# Patient Record
Sex: MALE | Race: WHITE
[De-identification: names, ages, dates, MRNs, and addresses within clinical notes are randomized per-mention and may not be internally consistent; named-entity substitution may affect disease eponyms.]

---

## 2017-12-07 NOTE — PCM.HP
H&P History of Present Illness





- General


Date of Service: 12/07/17


Admit Problem/Dx: 


 Admission Diagnosis/Problem





Admission Diagnosis/Problem      Abdominal pain








Source of Information: Patient, Provider





- History of Present Illness


Initial Comments - Free Text/Narative: 





He presented to the ED today with upper abdominal pain. He has been "drinking 

more than I should" lately. He drank six beers yesterday and none today.





 He did not vomit





He was hospitalized several days for pancreatitis several years ago.








  ** Upper Abdomen


Pain Score (Numeric/FACES): 6





- Related Data


Allergies/Adverse Reactions: 


 Allergies











Allergy/AdvReac Type Severity Reaction Status Date / Time


 


No Known Allergies Allergy   Verified 12/07/17 19:22











Home Medications: 


 Home Meds





Fenofibrate Nanocrystallized [Fenofibrate] 145 mg PO DAILY 05/15/15 [History]


Venlafaxine HCl [Venlafaxine ER] 75 tab PO DAILY 05/15/15 [History]


Zolpidem Tartrate 5 mg PO ASDIRECTED PRN 05/15/15 [History]


Insulin Glargine,Hum.Rec.Anlog [Toujeo Solostar] 0 unit SQ DAILY 12/07/17 [

History]


Lisinopril 5 mg PO DAILY 12/07/17 [History]


atorvaSTATin [Lipitor] 40 mg PO BEDTIME 12/07/17 [History]











Past Medical History


Cardiovascular History: Reports: High Cholesterol, Hypertension.  Denies: CAD


Respiratory History: Denies: COPD


Gastrointestinal History: Reports: Pancreatitis.  Denies: Cirrhosis


Genitourinary History: Denies: Chronic Renal Insuffiency


Neurological History: Denies: CVA, MS


Endocrine/Metabolic History: Reports: Diabetes, Type II


Hematologic History: Denies: Anticoagulation Therapy


Oncologic (Cancer) History: Reports: None





- Past Surgical History


Other Surgical History Comment: no prior abdominal surgeries





Social & Family History





- Tobacco Use


Smoking Status *Q: Current Every Day Smoker


Years of Tobacco use: 25


Packs/Tins Daily: 1





- Alcohol Use


Days Per Week of Alcohol Use: 4


Number of Drinks Per Day: 6


Total Drinks Per Week: 24





- Recreational Drug Use


Recreational Drug Use: No


Drug Use in Last 12 Months: No





H&P Review of Systems





- Review of Systems:


Review Of Systems: See Below


General: Denies: Fever, Chills


HEENT: Denies: Ear Pain, Headaches, Sore Throat


Pulmonary: Denies: Shortness of Breath, Wheezing, Cough, Sputum, Hemoptysis


Cardiovascular: Denies: Chest Pain, Edema


Gastrointestinal: Reports: Abdominal Pain.  Denies: Black Stool, Bloody Stool, 

Diarrhea, Hematemesis, Hematochezia, Vomiting


Genitourinary: Denies: Dysuria, Frequency, Hematuria


Neurological: Denies: Confusion





Exam





- Exam


Exam: See Below





- Vital Signs


Vital Signs: 


 Last Vital Signs











Temp  98.0 F   12/07/17 22:12


 


Pulse  88   12/07/17 22:12


 


Resp  18   12/07/17 22:12


 


BP  151/104 H  12/07/17 22:12


 


Pulse Ox  96   12/07/17 22:12











Weight: 95.6 kg





- Exam


General: Alert, Oriented


HEENT: Conjunctiva Clear, EOMI, Mucosa Moist & Pink


Neck: Supple, Trachea Midline


Lungs: Clear to Auscultation, Normal Respiratory Effort


Cardiovascular: Regular Rate, Regular Rhythm


GI/Abdominal Exam: Soft, Tender (mild diffuse upper abdominal tenderness).  No: 

Non-Tender, Distended, Guarding, Rebound


Extremities: No Pedal Edema


Neurological: Cranial Nerves Intact, Normal Speech





- Patient Data


Result Diagrams: 


 12/07/17 19:28





 12/07/17 19:28





*Q Meaningful Use (ADM)





- VTE *Q


VTE Criteria *Q: 








- Stroke *Q


Stroke Criteria *Q: 








- AMI *Q


AMI Criteria *Q: 








- Problem List


(1) Pancreatitis


SNOMED Code(s): 40188955


   ICD Code: K85.90 - ACUTE PANCREATITIS WITHOUT NECROSIS OR INFECTION, UNSP   

Status: Acute   Current Visit: Yes   


Problem List Initiated/Reviewed/Updated: Yes


Orders Last 24hrs: 


 Medication Orders





Sodium Chloride (Saline Flush)  10 ml FLUSH ASDIRECTED PRN


   PRN Reason: Keep Vein Open


Sodium Chloride (Saline Flush)  2.5 ml FLUSH ASDIRECTED PRN


   PRN Reason: Keep Vein Open








Assessment/Plan Comment:: 





bowel rest


close monitoring





Varun Coleman MD

## 2017-12-07 NOTE — EDM.PDOC
ED HPI GENERAL MEDICAL PROBLEM





- General


Chief Complaint: Abdominal Pain


Stated Complaint: PT HAS STOMACH PAINS


Time Seen by Provider: 12/07/17 19:19


Source of Information: Reports: Patient


History Limitations: Reports: No Limitations





- History of Present Illness


INITIAL COMMENTS - FREE TEXT/NARRATIVE: 





HISTORY AND PHYSICAL:


[]52-year-old  male presenting with bilateral side abdominal pain





History of Present Illness:


[Patient is type II diabetic


History of pancreatitis


Pain started today]





Review of Systems:


As per history of present illness and below otherwise all 


systems reviewed and negative.  





Past medical history:


As per history of present illness and as reviewed below


otherwise noncontributory.





Surgical history:


As per history of present illness and as reviewed below


otherwise noncontributory.





Social history:


No reported history of drug or alcohol abuse.





Family history:


As per history of present illness and as reviewed below


otherwise noncontributory.





Physical exam:


Alert and oriented gentleman answering questions appropriately speaking in full 

sentences without any shortness of breath. Skin is warm and dry. Patient is 

rating his pain as 8 out of 10


HEENT: Atraumatic, normocehpalic, pupils reactive, negative for conjunctival 

pallor or scleral icterus, mucous membranes moist, throat clear, neck supple, 

nontender, trachea midline.  


Lungs: Wheezing on auscultation, breath sounds equal bilaterally, chest non 

tender.  


Heart: S1S2, regular, negative for clicks, rubs, or JVD.


Abdomen: Soft, mildly distended, tender to the left epigastric no rebound and 

no guarding.  Negative for masses or hepatossplenmegaly. Negative for 

costovertebral tenderness.


Pelvis: Stable nontender.


Genitourinary: Deferred.


Rectal: Deferred


Extremities: Atraumatic, negative for cords or calf pain.  


Neurovascular unremarkable.


Neuro:  Awake, alert, oriented.  Cranial nerves II through XII


unremarkable.  Cerebellum unremarkable.  Motor and sensory unremarkable 

throughout.  Exam nonfocal.  


Discussed with parents that child does not have appendicitis CT scan shows 

mesenteric adenitis which is generally a viral infection





Diagnostics:


[CBC CMP amylase lipase UA and chest x-ray]





Therapeutics:


[]Normal saline


Morphine





Impression:


[Mesenteric adenitis]





Plan:


[]Discharged home


Tylenol alternated with Motrin as needed for fever every 3 hours


Soft food, chicken soup for the next couple days then may increase as tolerated





Definitive disposition and diagnosis as appropriate pending


reevaluation and review of above.  





Onset: Today, Sudden


Duration: Hour(s):


Location: Reports: Abdomen


Quality: Reports: Ache


Severity: Moderate


Improves with: Reports: None


Worsens with: Reports: None


Associated Symptoms: Reports: Fever/Chills


  ** Upper Abdomen


Pain Score (Numeric/FACES): 8





- Related Data


 Allergies











Allergy/AdvReac Type Severity Reaction Status Date / Time


 


No Known Allergies Allergy   Verified 12/07/17 19:22











Home Meds: 


 Home Meds





Fenofibrate Nanocrystallized [Fenofibrate] 145 mg PO DAILY 05/15/15 [History]


Venlafaxine HCl [Venlafaxine ER] 75 tab PO DAILY 05/15/15 [History]


Zolpidem Tartrate 5 mg PO ASDIRECTED PRN 05/15/15 [History]


Insulin Glargine,Hum.Rec.Anlog [Toujeo Solostar] 0 unit SQ DAILY 12/07/17 [

History]


Lisinopril 5 mg PO DAILY 12/07/17 [History]


atorvaSTATin [Lipitor] 40 mg PO BEDTIME 12/07/17 [History]











Social & Family History





- Tobacco Use


Smoking Status *Q: Current Every Day Smoker


Years of Tobacco use: 25





- Alcohol Use


Days Per Week of Alcohol Use: 4


Number of Drinks Per Day: 6


Total Drinks Per Week: 24





- Recreational Drug Use


Recreational Drug Use: No


Drug Use in Last 12 Months: No





ED ROS GENERAL





- Review of Systems


Review Of Systems: ROS reveals no pertinent complaints other than HPI.





ED EXAM, GI/ABD





- Physical Exam


Exam: See Below (see dictation)





Course





- Vital Signs


Last Recorded V/S: 


 Last Vital Signs











Temp  36.3 C   12/07/17 19:24


 


Pulse  82   12/07/17 19:24


 


Resp  18   12/07/17 19:24


 


BP  154/99 H  12/07/17 19:24


 


Pulse Ox  99   12/07/17 19:24














- Orders/Labs/Meds


Orders: 


 Active Orders 24 hr











 Category Date Time Status


 


 Abdomen Pelvis w Cont [CT] Stat Exams  12/07/17 20:09 Ordered


 


 Sodium Chloride 0.9% [Normal Saline] 1,000 ml Med  12/07/17 19:23 Active





 IV STAT   


 


 Sodium Chloride 0.9% [Normal Saline] 1,000 ml Med  12/07/17 20:13 Active





 IV STAT   


 


 Sodium Chloride 0.9% [Saline Flush] Med  12/07/17 19:21 Active





 10 ml FLUSH ASDIRECTED PRN   


 


 Sodium Chloride 0.9% [Saline Flush] Med  12/07/17 19:21 Active





 2.5 ml FLUSH ASDIRECTED PRN   


 


 Saline Lock Insert [OM.PC] Stat Oth  12/07/17 19:22 Ordered








 Medication Orders





Sodium Chloride (Normal Saline)  1,000 mls @ 999 mls/hr IV STAT ONE


   Stop: 12/07/17 20:23


   Last Admin: 12/07/17 19:54  Dose: 999 mls/hr


Sodium Chloride (Normal Saline)  1,000 mls @ 999 mls/hr IV STAT ONE


   Stop: 12/07/17 21:13


Sodium Chloride (Saline Flush)  10 ml FLUSH ASDIRECTED PRN


   PRN Reason: Keep Vein Open


Sodium Chloride (Saline Flush)  2.5 ml FLUSH ASDIRECTED PRN


   PRN Reason: Keep Vein Open








Labs: 


 Laboratory Tests











  12/07/17 12/07/17 12/07/17 Range/Units





  19:15 19:28 19:28 


 


WBC   11.41 H   (4.0-11.0)  K/uL


 


RBC   4.85   (4.50-5.90)  M/uL


 


Hgb   16.0   (13.0-17.0)  g/dL


 


Hct   46.1   (38.0-50.0)  %


 


MCV   95.1   (80.0-98.0)  fL


 


MCH   33.0 H   (27.0-32.0)  pg


 


MCHC   34.7   (31.0-37.0)  g/dL


 


RDW Std Deviation   46.4   (28.0-62.0)  fl


 


RDW Coeff of Jaden   14   (11.0-15.0)  %


 


Plt Count   190   (150-400)  K/uL


 


MPV   11.30   (7.40-12.00)  fL


 


Neut % (Auto)   64.6   (48.0-80.0)  %


 


Lymph % (Auto)   24.9   (16.0-40.0)  %


 


Mono % (Auto)   7.7   (0.0-15.0)  %


 


Eos % (Auto)   2.3   (0.0-7.0)  %


 


Baso % (Auto)   0.5   (0.0-1.5)  %


 


Neut # (Auto)   7.4 H   (1.4-5.7)  K/uL


 


Lymph # (Auto)   2.8 H   (0.6-2.4)  K/uL


 


Mono # (Auto)   0.9 H   (0.0-0.8)  K/uL


 


Eos # (Auto)   0.3   (0.0-0.7)  K/uL


 


Baso # (Auto)   0.1   (0.0-0.1)  K/uL


 


Nucleated RBC %   0.0   /100WBC


 


Nucleated RBCs #   0   K/uL


 


Lactate     (0.20-2.00)  mmol/L


 


Sodium    141  (136-146)  mmol/L


 


Potassium    4.0  (3.5-5.1)  mmol/L


 


Chloride    103  ()  mmol/L


 


Carbon Dioxide    26  (21-31)  mmol/L


 


BUN    16  (6.0-23.0)  mg/dL


 


Creatinine    1.0  (0.6-1.5)  mg/dL


 


Est Cr Clr Drug Dosing    76.21  mL/min


 


Estimated GFR (MDRD)    > 60.0  ml/min


 


Glucose    128 H  ()  mg/dL


 


Calcium    10.5  (8.8-10.8)  mg/dL


 


Total Bilirubin    0.6  (0.1-1.5)  mg/dL


 


AST    48 H  (5-40)  IU/L


 


ALT    47  (8-54)  IU/L


 


Alkaline Phosphatase    45  ()  


 


Total Protein    8.3 H  (6.0-8.0)  g/dL


 


Albumin    4.8  (3.5-5.0)  g/dL


 


Globulin    3.5  (2.0-3.5)  g/dL


 


Albumin/Globulin Ratio    1.4  (1.3-2.8)  


 


Amylase    132 H  (10-90)  U/L


 


Lipase    248 H  (7-80)  U/L


 


Urine Color  YELLOW    


 


Urine Appearance  CLOUDY    


 


Urine pH  8.0    (5.0-8.0)  


 


Ur Specific Gravity  1.015    (1.001-1.035)  


 


Urine Protein  NEGATIVE    (NEGATIVE)  mg/dL


 


Urine Glucose (UA)  NEGATIVE    (NEGATIVE)  mg/dL


 


Urine Ketones  NEGATIVE    (NEGATIVE)  mg/dL


 


Urine Occult Blood  NEGATIVE    (NEGATIVE)  


 


Urine Nitrite  NEGATIVE    (NEGATIVE)  


 


Urine Bilirubin  NEGATIVE    (NEGATIVE)  


 


Urine Urobilinogen  0.2    (<2.0)  EU/dL


 


Ur Leukocyte Esterase  NEGATIVE    (NEGATIVE)  


 


Urine RBC  0-1    (0-2/HPF)  


 


Urine WBC  0-1    (0-5/HPF)  


 


Ur Epithelial Cells  RARE    (NONE-FEW)  


 


Amorphous Sediment  MODERATE    (NEGATIVE)  


 


Urine Bacteria  FEW    (NEGATIVE)  














  12/07/17 Range/Units





  19:28 


 


WBC   (4.0-11.0)  K/uL


 


RBC   (4.50-5.90)  M/uL


 


Hgb   (13.0-17.0)  g/dL


 


Hct   (38.0-50.0)  %


 


MCV   (80.0-98.0)  fL


 


MCH   (27.0-32.0)  pg


 


MCHC   (31.0-37.0)  g/dL


 


RDW Std Deviation   (28.0-62.0)  fl


 


RDW Coeff of Jaden   (11.0-15.0)  %


 


Plt Count   (150-400)  K/uL


 


MPV   (7.40-12.00)  fL


 


Neut % (Auto)   (48.0-80.0)  %


 


Lymph % (Auto)   (16.0-40.0)  %


 


Mono % (Auto)   (0.0-15.0)  %


 


Eos % (Auto)   (0.0-7.0)  %


 


Baso % (Auto)   (0.0-1.5)  %


 


Neut # (Auto)   (1.4-5.7)  K/uL


 


Lymph # (Auto)   (0.6-2.4)  K/uL


 


Mono # (Auto)   (0.0-0.8)  K/uL


 


Eos # (Auto)   (0.0-0.7)  K/uL


 


Baso # (Auto)   (0.0-0.1)  K/uL


 


Nucleated RBC %   /100WBC


 


Nucleated RBCs #   K/uL


 


Lactate  1.3  (0.20-2.00)  mmol/L


 


Sodium   (136-146)  mmol/L


 


Potassium   (3.5-5.1)  mmol/L


 


Chloride   ()  mmol/L


 


Carbon Dioxide   (21-31)  mmol/L


 


BUN   (6.0-23.0)  mg/dL


 


Creatinine   (0.6-1.5)  mg/dL


 


Est Cr Clr Drug Dosing   mL/min


 


Estimated GFR (MDRD)   ml/min


 


Glucose   ()  mg/dL


 


Calcium   (8.8-10.8)  mg/dL


 


Total Bilirubin   (0.1-1.5)  mg/dL


 


AST   (5-40)  IU/L


 


ALT   (8-54)  IU/L


 


Alkaline Phosphatase   ()  


 


Total Protein   (6.0-8.0)  g/dL


 


Albumin   (3.5-5.0)  g/dL


 


Globulin   (2.0-3.5)  g/dL


 


Albumin/Globulin Ratio   (1.3-2.8)  


 


Amylase   (10-90)  U/L


 


Lipase   (7-80)  U/L


 


Urine Color   


 


Urine Appearance   


 


Urine pH   (5.0-8.0)  


 


Ur Specific Gravity   (1.001-1.035)  


 


Urine Protein   (NEGATIVE)  mg/dL


 


Urine Glucose (UA)   (NEGATIVE)  mg/dL


 


Urine Ketones   (NEGATIVE)  mg/dL


 


Urine Occult Blood   (NEGATIVE)  


 


Urine Nitrite   (NEGATIVE)  


 


Urine Bilirubin   (NEGATIVE)  


 


Urine Urobilinogen   (<2.0)  EU/dL


 


Ur Leukocyte Esterase   (NEGATIVE)  


 


Urine RBC   (0-2/HPF)  


 


Urine WBC   (0-5/HPF)  


 


Ur Epithelial Cells   (NONE-FEW)  


 


Amorphous Sediment   (NEGATIVE)  


 


Urine Bacteria   (NEGATIVE)  











Meds: 


Medications











Generic Name Dose Route Start Last Admin





  Trade Name Freq  PRN Reason Stop Dose Admin


 


Sodium Chloride  1,000 mls @ 999 mls/hr  12/07/17 19:23  12/07/17 19:54





  Normal Saline  IV  12/07/17 20:23  999 mls/hr





  STAT ONE   Administration


 


Sodium Chloride  1,000 mls @ 999 mls/hr  12/07/17 20:13  





  Normal Saline  IV  12/07/17 21:13  





  STAT ONE   


 


Sodium Chloride  10 ml  12/07/17 19:21  





  Saline Flush  FLUSH   





  ASDIRECTED PRN   





  Keep Vein Open   


 


Sodium Chloride  2.5 ml  12/07/17 19:21  





  Saline Flush  FLUSH   





  ASDIRECTED PRN   





  Keep Vein Open   














Discontinued Medications














Generic Name Dose Route Start Last Admin





  Trade Name Freq  PRN Reason Stop Dose Admin


 


Iopamidol  100 ml  12/07/17 20:18  





  Isovue Multipack-370 (76%)  IVPUSH  12/07/17 20:19  





  ONETIME STA   


 


Morphine Sulfate  2 mg  12/07/17 19:23  12/07/17 19:54





  Morphine  IVPUSH  12/07/17 19:24  2 mg





  ONETIME ONE   Administration


 


Morphine Sulfate  2 mg  12/07/17 20:13  





  Morphine  IV  12/07/17 20:14  





  ONETIME ONE   














Departure





- Departure


Time of Disposition: 20:23


Disposition: Home, Self-Care 01


Condition: Good


Clinical Impression: 


 Mesenteric adenitis








- Discharge Information


Instructions:  Abdominal Pain, Adult, Easy-to-Read


Referrals: 


PCP,None [Primary Care Provider] - 


Forms:  ED Department Discharge


Additional Instructions: 


The following information is given to patients seen in the emergency department 

who are being discharged to home. This information is to outline your options 

for follow-up care. We provide all patients seen in our emergency department 

with a follow-up referral.





The need for follow-up, as well as the timing and circumstances, are variable 

depending upon the specifics of your emergency department visit.





If you don't have a primary care physician on staff, we will provide you with a 

referral. We always advise you to contact your personal physician following an 

emergency department visit to inform them of the circumstance of the visit and 

for follow-up with them and/or the need for any referrals to a consulting 

specialist.





The emergency department will also refer you to a specialist when appropriate. 

This referral assures that you have the opportunity for followup care with a 

specialist. All of these measure are taken in an effort to provide you with 

optimal care, which includes your followup.





Under all circumstances we always encourage you to contact your private 

physician who remains a resource for coordinating  your care. When calling for 

followup care, please make the office aware that this follow-up is from your 

recent emergency room visit. If for any reason you are refused follow-up, 

please contact the [facility] emergency department at [phone] 

and asked to speak to the emergency department charge nurse.





Your condition is a viral illness CT scan did not show any signs of appendicitis


Small sips of water or fluids every 20 minutes will keep you hydrated


Recommend that you have soft foods the next couple of days,  chicken soup





- My Orders


Last 24 Hours: 


My Active Orders





12/07/17 19:21


Sodium Chloride 0.9% [Saline Flush]   10 ml FLUSH ASDIRECTED PRN 


Sodium Chloride 0.9% [Saline Flush]   2.5 ml FLUSH ASDIRECTED PRN 





12/07/17 19:22


Saline Lock Insert [OM.PC] Stat 





12/07/17 19:23


Sodium Chloride 0.9% [Normal Saline] 1,000 ml IV STAT 





12/07/17 20:09


Abdomen Pelvis w Cont [CT] Stat 





12/07/17 20:13


Sodium Chloride 0.9% [Normal Saline] 1,000 ml IV STAT 














- Assessment/Plan


Last 24 Hours: 


My Active Orders





12/07/17 19:21


Sodium Chloride 0.9% [Saline Flush]   10 ml FLUSH ASDIRECTED PRN 


Sodium Chloride 0.9% [Saline Flush]   2.5 ml FLUSH ASDIRECTED PRN 





12/07/17 19:22


Saline Lock Insert [OM.PC] Stat 





12/07/17 19:23


Sodium Chloride 0.9% [Normal Saline] 1,000 ml IV STAT 





12/07/17 20:09


Abdomen Pelvis w Cont [CT] Stat 





12/07/17 20:13


Sodium Chloride 0.9% [Normal Saline] 1,000 ml IV STAT

## 2017-12-08 NOTE — CT
EXAM DATE: 17



PATIENT'S AGE: 52



Patient: KATELYNN SANTOS



Facility: [place], [place]

Patient ID: [id number]

Site Patient ID: [id number].

Site Accession #: [id number].

: 1965

Study: CT Abdomen/Pelvis [id number]/[id number] 9:17:41 PM

Ordering Physician: Doctor Temp



Final Report: 

INDICATION: 

UPPER ABD PAIN





TECHNIQUE:

CT abdomen and pelvis acquired with IV contrast.



COMPARISON:

2012 



FINDINGS:

Lower chest: Unremarkable. 

Liver: Diffuse fatty infiltration of the liver 

Spleen: Unremarkable. 

Pancreas: Mild peripancreatic stranding along the uncinate process and head of 
the pancreas. 

Gallbladder and bile ducts: Unremarkable. 

Kidneys: Stable left renal cyst. . 

Adrenal glands: Unremarkable. 

GI tract: Liquid stool throughout the colon. Fluid-filled loops of small bowel. 
Scattered colonic diverticulosis with no evidence of acute diverticulitis. 
Appendix is normal. 

Vascular structures: Atherosclerotic disease. No sign of aneurysm. 

Lymph nodes: Unremarkable. 

Miscellaneous: Unremarkable. No free air or significant free fluid. 

Pelvic Organs: The prostate is prominent in size. 

Bones: Multilevel degenerative changes. 



IMPRESSION:

1. Liquid stool throughout the colon. Fluid-filled loops of small bowel. 
Findings are nonspecific, and can be seen with gastroenteritis/enterocolitis.

2. Mild acute pancreatitis along the uncinate process and head of the pancreas



Dictated by Alan Ann MD @ 2017 9:33:31 PM



Dictated by: Alan Ann MD @ 2017 21:33:42

(Electronic Signature)



Report Signed by Proxy.
[facility]

## 2017-12-08 NOTE — PCM.PN
- General Info


Date of Service: 12/08/17


Admission Dx/Problem (Free Text): 


 Admission Diagnosis/Problem





Admission Diagnosis/Problem      Abdominal pain, pancreatitis








Subjective Update: 





Feeling better than when he arrived to ED last night. No chest pain or SOB. 

Pain 3/10 currently, to epigastric and wraps to LUQ. Having diarrhea as well. 

Took some Milk of Mag yesterday because of cramping, he thought he might be 

constipated. 


Functional Status: Reports: Pain Controlled, Tolerating Diet, Ambulating, 

Urinating





- Review of Systems


HEENT: Reports: No Symptoms.  Denies: Sore Throat


Pulmonary: Reports: No Symptoms.  Denies: Shortness of Breath


Cardiovascular: Reports: No Symptoms.  Denies: Chest Pain


Gastrointestinal: Reports: Abdominal Pain (epigastric wrapping around LUQ), 

Diarrhea, Flatus.  Denies: Nausea, Vomiting


Genitourinary: Reports: No Symptoms.  Denies: Dysuria, Frequency, Burning


Musculoskeletal: Reports: No Symptoms


Neurological: Reports: No Symptoms


Psychiatric: Reports: No Symptoms





- Patient Data


Vitals - Most Recent: 


 Last Vital Signs











Temp  98.6 F   12/08/17 04:00


 


Pulse  75   12/08/17 04:00


 


Resp  20   12/08/17 04:00


 


BP  162/102 H  12/08/17 04:00


 


Pulse Ox  95   12/08/17 04:00











Weight - Most Recent: 94.7 kg


I&O - Last 24 Hours: 


 Intake & Output











 12/07/17 12/08/17 12/08/17





 22:59 06:59 14:59


 


Intake Total  1575 


 


Output Total  400 


 


Balance  1175 











Lab Results Last 24 Hours: 


 Laboratory Results - last 24 hr











  12/08/17 12/08/17 Range/Units





  05:24 05:24 


 


WBC  8.20   (4.0-11.0)  K/uL


 


RBC  4.47 L   (4.50-5.90)  M/uL


 


Hgb  14.5   (13.0-17.0)  g/dL


 


Hct  43.2   (38.0-50.0)  %


 


MCV  96.6   (80.0-98.0)  fL


 


MCH  32.4 H   (27.0-32.0)  pg


 


MCHC  33.6   (31.0-37.0)  g/dL


 


RDW Std Deviation  47.3   (28.0-62.0)  fl


 


RDW Coeff of Jaden  14   (11.0-15.0)  %


 


Plt Count  155   (150-400)  K/uL


 


MPV  11.50   (7.40-12.00)  fL


 


Neut % (Auto)  58.4   (48.0-80.0)  %


 


Lymph % (Auto)  29.8   (16.0-40.0)  %


 


Mono % (Auto)  8.9   (0.0-15.0)  %


 


Eos % (Auto)  2.4   (0.0-7.0)  %


 


Baso % (Auto)  0.5   (0.0-1.5)  %


 


Neut # (Auto)  4.8   (1.4-5.7)  K/uL


 


Lymph # (Auto)  2.4   (0.6-2.4)  K/uL


 


Mono # (Auto)  0.7   (0.0-0.8)  K/uL


 


Eos # (Auto)  0.2   (0.0-0.7)  K/uL


 


Baso # (Auto)  0.0   (0.0-0.1)  K/uL


 


Nucleated RBC %  0.0   /100WBC


 


Nucleated RBCs #  0   K/uL


 


Sodium   142  (136-146)  mmol/L


 


Potassium   4.4  (3.5-5.1)  mmol/L


 


Chloride   111 H  ()  mmol/L


 


Carbon Dioxide   23  (21-31)  mmol/L


 


BUN   13  (6.0-23.0)  mg/dL


 


Creatinine   0.9  (0.6-1.5)  mg/dL


 


Est Cr Clr Drug Dosing   84.67  mL/min


 


Estimated GFR (MDRD)   > 60.0  ml/min


 


Glucose   94  ()  mg/dL


 


Calcium   8.5 L  (8.8-10.8)  mg/dL


 


Magnesium   2.3  (1.5-2.3)  mEq/L


 


Triglycerides   217 H  ()  mg/dL


 


Cholesterol   131  (131-240)  mg/dL


 


LDL Cholesterol, Calc   56 L  ()  mg/dL


 


VLDL Cholesterol   43  (5-55)  mg/dL


 


HDL Cholesterol   32 L  (40-80)  mg/dL


 


Cholesterol/HDL Ratio   4.1  (3.3-6.0)  


 


Lipase   72  (7-80)  U/L











Med Orders - Current: 


 Current Medications





Atorvastatin Calcium (Lipitor)  40 mg PO BEDTIME [facility]


Folic Acid (Folic Acid)  1 mg PO DAILY [facility]


Sodium Chloride (Normal Saline)  1,000 mls @ 150 mls/hr IV ASDIRECTED [facility]


   Last Admin: 12/08/17 06:43 Dose:  150 mls/hr


Insulin Aspart (Novolog)  0 unit SUBCUT ACBED [facility]


   PRN Reason: Protocol


   Last Admin: 12/08/17 06:49 Dose:  Not Given


Insulin Glargine (Lantus Solostar)  10 units SUBCUT BIDAC [facility]


   Last Admin: 12/08/17 06:58 Dose:  Not Given


Lisinopril (Prinivil)  5 mg PO DAILY [facility]


Lorazepam (Ativan)  1 mg IVPUSH Q4H PRN


   PRN Reason: Anxiety


Morphine Sulfate (Morphine)  2 mg IVPUSH Q2H PRN


   PRN Reason: Pain (severe 7-10)


   Stop: 12/08/17 22:40


   Last Admin: 12/08/17 06:56 Dose:  2 mg


Ondansetron HCl (Zofran)  4 mg IVPUSH Q4H PRN


   PRN Reason: Nausea


Fenofibrate 145 Mg  1 each PO DAILY [facility]


Sodium Chloride (Saline Flush)  10 ml FLUSH ASDIRECTED PRN


   PRN Reason: Keep Vein Open


Sodium Chloride (Saline Flush)  2.5 ml FLUSH ASDIRECTED PRN


   PRN Reason: Keep Vein Open


Temazepam (Restoril)  15 mg PO BEDTIME PRN


   PRN Reason: Insomnia


   Last Admin: 12/07/17 23:49 Dose:  15 mg


Thiamine HCl (Vitamin B-1)  100 mg IV DAILY [facility]


Venlafaxine HCl (Effexor Xr)  75 mg PO DAILY [facility]





Discontinued Medications





Sodium Chloride (Normal Saline)  1,000 mls @ 999 mls/hr IV STAT ONE


   Stop: 12/07/17 20:23


   Last Admin: 12/07/17 19:54 Dose:  999 mls/hr


Sodium Chloride (Normal Saline)  1,000 mls @ 999 mls/hr IV STAT ONE


   Stop: 12/07/17 21:13


   Last Admin: 12/07/17 20:34 Dose:  999 mls/hr


Influenza Virus Vaccine (Fluarix Quad 5501-7711)  60 mcg IM .ONCE ONE


   Stop: 12/07/17 23:46


Iopamidol (Isovue Multipack-370 (76%))  100 ml IVPUSH ONETIME STA


   Stop: 12/07/17 20:19


   Last Admin: 12/07/17 20:23 Dose:  100 ml


Morphine Sulfate (Morphine)  2 mg IVPUSH ONETIME ONE


   Stop: 12/07/17 19:24


   Last Admin: 12/07/17 19:54 Dose:  2 mg


Morphine Sulfate (Morphine)  2 mg IV ONETIME ONE


   Stop: 12/07/17 20:14


   Last Admin: 12/07/17 20:36 Dose:  2 mg


Pantoprazole Sodium (Protonix Iv***)  80 mg IVPUSH .BOLUS ONE


   Stop: 12/07/17 21:01


   Last Admin: 12/07/17 21:20 Dose:  80 mg











- Exam


Quality Assessment: DVT Prophylaxis


General: Alert, Oriented, Cooperative, No Acute Distress


Neck: Supple


Cardiovascular: Regular Rate, Regular Rhythm


GI/Abdominal Exam: Normal Bowel Sounds, Soft, No Organomegaly, No Distention, 

No Abnormal Bruit, No Mass, Pelvis Stable, Tender (Epigastric region)


Extremities: Normal Inspection, Normal Range of Motion, Non-Tender, No Pedal 

Edema, Normal Capillary Refill


Neurological: Other (scant tremor noted in hands. )


Psy/Mental Status: Alert, Normal Affect, Normal Mood





- Problem List & Annotations


(1) Pancreatitis


SNOMED Code(s): 76513120


   Code(s): K85.90 - ACUTE PANCREATITIS WITHOUT NECROSIS OR INFECTION, UNSP   

Status: Acute   Current Visit: Yes   


Qualifiers: 


   Chronicity: acute   Pancreatitis type: alcohol induced   Acute pancreatitis 

complication: no infection or necrosis   Qualified Code(s): K85.20 - Alcohol 

induced acute pancreatitis without necrosis or infection   





(2) Alcohol abuse


SNOMED Code(s): 52914557


   Code(s): F10.10 - ALCOHOL ABUSE, UNCOMPLICATED   Status: Chronic   Current 

Visit: Yes   





(3) DM type 2 (diabetes mellitus, type 2)


SNOMED Code(s): 64534660


   Code(s): E11.9 - TYPE 2 DIABETES MELLITUS WITHOUT COMPLICATIONS   Status: 

Chronic   Current Visit: Yes   


Qualifiers: 


   Diabetes mellitus complication status: without complication   Diabetes 

mellitus long term insulin use: with long term use   Qualified Code(s): E11.9 - 

Type 2 diabetes mellitus without complications; Z79.4 - Long term (current) use 

of insulin; Z79.4 - Long term (current) use of insulin; Z79.4 - Long term (

current) use of insulin; Z79.4 - Long term (current) use of insulin   





(4) HTN (hypertension)


SNOMED Code(s): 94103251


   Code(s): I10 - ESSENTIAL (PRIMARY) HYPERTENSION   Status: Chronic   Current 

Visit: Yes   


Qualifiers: 


   Hypertension type: essential hypertension   Qualified Code(s): I10 - 

Essential (primary) hypertension   





(5) Hyperlipidemia


SNOMED Code(s): 21053143


   Code(s): E78.5 - HYPERLIPIDEMIA, UNSPECIFIED   Status: Chronic   Current 

Visit: Yes   





- Problem List Review


Problem List Initiated/Reviewed/Updated: Yes





- My Orders


Last 24 Hours: 


My Active Orders





12/08/17 07:57


[facility] Assessment [RC] Q4H 





12/08/17 09:00


Folic Acid   1 mg PO DAILY 


Thiamine [Vitamin B-1]   100 mg IV DAILY 














- Plan


Plan:: 





This 52 year old male admitted with abdominal pain, found to have acute alcohol 

induced pancreatitis





1. Acute pancreatitis: Improving, Lipase 72 today. No nausea, CL causing some 

increased pain. Continue IVF,  for now. Analgesia PRN pain. Having some 

diarrhea, will check stool studies as well, due to possible gastroenteritis 

notes on CT.





2. Alcohol abuse: scant tremore noted. [facility] protocol ordered. Ativan PRN. Will 

supplement with Folic acid and thiamine. 





3. DM type 2. Monitor BSStephanie held this am due to BS of 90. Novolog SSI 

ordered TIDAC. Triglycerides 217 today. 





4.  HTN: Slightly elevated, pain related? Continue Lisinopril today and monitor.





5. Hyperlipidemia: Triglycerides 217. Continue Atorvastatin and Fenofibrate.





VTE prophylaxis:   SCDs due to daily alcohol use.





Dispo: 1-2 days pending improvement.

## 2017-12-09 NOTE — [id number]
**Discharge Summary





- Hospital Course


Free Text/Narrative:: 





53 yo male with history of alchol abuse admitted for pancreatitis. His wbc wnl, 

amylase 132, lipase 72. abdominal CT showed mild acute pancreatitis. He was 

givine IVF kept NPO. IV mophine for pain control. He clinically improved. His 

diet was advanced. He tolerated it well and his pain has resolved. He is 

discharged in stable condition and to resume home medications. He was advised 

to refrain from alcohol use. He had agreed. He is to follow up with PCP. 





- Discharge Data


Discharge Date: 12/09/17


Discharge Disposition: Home, Self-Care 01


Condition: Good





- Discharge Plan


Home Medications: 


 Home Meds





Fenofibrate Nanocrystallized [Fenofibrate] 145 mg PO DAILY 05/15/15 [History]


Venlafaxine HCl [Venlafaxine ER] 75 tab PO DAILY 05/15/15 [History]


Zolpidem Tartrate 5 mg PO BEDTIME 05/15/15 [History]


Insulin Glargine,Hum.Rec.Anlog [Toujeo Solostar] 17 unit SQ DAILY 12/07/17 [

History]


Lisinopril 5 mg PO DAILY 12/07/17 [History]


atorvaSTATin [Lipitor] 40 mg PO BEDTIME 12/07/17 [History]








Patient Handouts:  Acute Pancreatitis, Easy-to-Read, Abdominal Pain, Adult, Easy

-to-Read


Referrals: 


PCP,None [Primary Care Provider] -  (Please follow-up with your Primary Care 

Provider within 1 week. )





- General Info


Date of Service: 12/09/17


Functional Status: Reports: Pain Controlled, Tolerating Diet, Ambulating, 

Urinating





- Review of Systems


General: Reports: No Symptoms


HEENT: Reports: No Symptoms


Pulmonary: Reports: No Symptoms


Cardiovascular: Reports: No Symptoms


Gastrointestinal: Reports: No Symptoms


Genitourinary: Reports: No Symptoms


Musculoskeletal: Reports: No Symptoms


Skin: Reports: No Symptoms


Neurological: Reports: No Symptoms


Psychiatric: Reports: No Symptoms





- Patient Data


Vitals - Most Recent: 


 Last Vital Signs











Temp  97.1 F   12/09/17 08:00


 


Pulse  75   12/09/17 08:00


 


Resp  18   12/09/17 08:00


 


BP  143/102 H  12/09/17 09:06


 


Pulse Ox  97   12/09/17 08:00











Weight - Most Recent: 94.7 kg


I&O - Last 24 hours: 


 Intake & Output











 12/08/17 12/09/17 12/09/17





 22:59 06:59 14:59


 


Intake Total 2883 1600 866


 


Output Total 2250 2800 


 


Balance 633 -1200 866











Lab Results - Last 24 hrs: 


 Laboratory Results - last 24 hr











  12/08/17 12/08/17 12/08/17 Range/Units





  11:28 17:08 20:21 


 


WBC     (4.0-11.0)  K/uL


 


RBC     (4.50-5.90)  M/uL


 


Hgb     (13.0-17.0)  g/dL


 


Hct     (38.0-50.0)  %


 


MCV     (80.0-98.0)  fL


 


MCH     (27.0-32.0)  pg


 


MCHC     (31.0-37.0)  g/dL


 


RDW Std Deviation     (28.0-62.0)  fl


 


RDW Coeff of Jaden     (11.0-15.0)  %


 


Plt Count     (150-400)  K/uL


 


MPV     (7.40-12.00)  fL


 


Neut % (Auto)     (48.0-80.0)  %


 


Lymph % (Auto)     (16.0-40.0)  %


 


Mono % (Auto)     (0.0-15.0)  %


 


Eos % (Auto)     (0.0-7.0)  %


 


Baso % (Auto)     (0.0-1.5)  %


 


Neut # (Auto)     (1.4-5.7)  K/uL


 


Lymph # (Auto)     (0.6-2.4)  K/uL


 


Mono # (Auto)     (0.0-0.8)  K/uL


 


Eos # (Auto)     (0.0-0.7)  K/uL


 


Baso # (Auto)     (0.0-0.1)  K/uL


 


Nucleated RBC %     /100WBC


 


Nucleated RBCs #     K/uL


 


Sodium     (136-146)  mmol/L


 


Potassium     (3.5-5.1)  mmol/L


 


Chloride     ()  mmol/L


 


Carbon Dioxide     (21-31)  mmol/L


 


BUN     (6.0-23.0)  mg/dL


 


Creatinine     (0.6-1.5)  mg/dL


 


Est Cr Clr Drug Dosing     mL/min


 


Estimated GFR (MDRD)     ml/min


 


Glucose     ()  mg/dL


 


POC Glucose  130 H  95  131 H  ()  mg/dL


 


Calcium     (8.8-10.8)  mg/dL














  12/09/17 12/09/17 12/09/17 Range/Units





  06:03 06:03 06:30 


 


WBC  5.78    (4.0-11.0)  K/uL


 


RBC  4.05 L    (4.50-5.90)  M/uL


 


Hgb  13.1    (13.0-17.0)  g/dL


 


Hct  39.4    (38.0-50.0)  %


 


MCV  97.3    (80.0-98.0)  fL


 


MCH  32.3 H    (27.0-32.0)  pg


 


MCHC  33.2    (31.0-37.0)  g/dL


 


RDW Std Deviation  47.8    (28.0-62.0)  fl


 


RDW Coeff of Jaden  13    (11.0-15.0)  %


 


Plt Count  130 L    (150-400)  K/uL


 


MPV  11.40    (7.40-12.00)  fL


 


Neut % (Auto)  64.2    (48.0-80.0)  %


 


Lymph % (Auto)  25.1    (16.0-40.0)  %


 


Mono % (Auto)  7.3    (0.0-15.0)  %


 


Eos % (Auto)  2.9    (0.0-7.0)  %


 


Baso % (Auto)  0.5    (0.0-1.5)  %


 


Neut # (Auto)  3.7    (1.4-5.7)  K/uL


 


Lymph # (Auto)  1.5    (0.6-2.4)  K/uL


 


Mono # (Auto)  0.4    (0.0-0.8)  K/uL


 


Eos # (Auto)  0.2    (0.0-0.7)  K/uL


 


Baso # (Auto)  0.0    (0.0-0.1)  K/uL


 


Nucleated RBC %  0.0    /100WBC


 


Nucleated RBCs #  0    K/uL


 


Sodium   142   (136-146)  mmol/L


 


Potassium   4.1   (3.5-5.1)  mmol/L


 


Chloride   113 H   ()  mmol/L


 


Carbon Dioxide   22   (21-31)  mmol/L


 


BUN   6   (6.0-23.0)  mg/dL


 


Creatinine   0.8   (0.6-1.5)  mg/dL


 


Est Cr Clr Drug Dosing   95.26   mL/min


 


Estimated GFR (MDRD)   > 60.0   ml/min


 


Glucose   116 H   ()  mg/dL


 


POC Glucose    97  ()  mg/dL


 


Calcium   8.4 L   (8.8-10.8)  mg/dL











Med Orders - Current: 


 Current Medications





Atorvastatin Calcium (Lipitor)  40 mg PO BEDTIME [facility]


   Last Admin: 12/08/17 20:22 Dose:  40 mg


Calcium Carbonate/Glycine (Tums)  1,000 mg PO Q2HR PRN


   PRN Reason: Indigestion


   Last Admin: 12/08/17 23:19 Dose:  1,000 mg


Folic Acid (Folic Acid)  1 mg PO DAILY [facility]


   Last Admin: 12/09/17 09:06 Dose:  1 mg


Thiamine HCl 100 mg/ Sodium (Chloride)  51 mls @ 204 mls/hr IV DAILY [facility]


   Last Admin: 12/09/17 09:07 Dose:  204 mls/hr


Insulin Aspart (Novolog)  0 unit SUBCUT ACBED [facility]


   PRN Reason: Protocol


   Last Admin: 12/09/17 06:44 Dose:  Not Given


Lisinopril (Prinivil)  5 mg PO DAILY [facility]


   Last Admin: 12/09/17 09:06 Dose:  5 mg


Lorazepam (Ativan)  1 mg IVPUSH Q4H PRN


   PRN Reason: Anxiety


Morphine Sulfate (Morphine)  2 mg IVPUSH Q2H PRN


   PRN Reason: Pain (severe 7-10)


   Last Admin: 12/08/17 20:27 Dose:  2 mg


Ondansetron HCl (Zofran)  4 mg IVPUSH Q4H PRN


   PRN Reason: Nausea


Fenofibrate 145 Mg  1 each PO DAILY [facility]


   Last Admin: 12/09/17 09:20 Dose:  Not Given


Sodium Chloride (Saline Flush)  10 ml FLUSH ASDIRECTED PRN


   PRN Reason: Keep Vein Open


Sodium Chloride (Saline Flush)  2.5 ml FLUSH ASDIRECTED PRN


   PRN Reason: Keep Vein Open


Temazepam (Restoril)  15 mg PO BEDTIME PRN


   PRN Reason: Insomnia


   Last Admin: 12/08/17 23:20 Dose:  15 mg


Venlafaxine HCl (Effexor Xr)  75 mg PO DAILY [facility]


   Last Admin: 12/09/17 09:07 Dose:  75 mg





Discontinued Medications





Acetaminophen (Tylenol)  650 mg PO NOW ONE


   Stop: 12/09/17 08:32


   Last Admin: 12/09/17 09:06 Dose:  650 mg


Sodium Chloride (Normal Saline)  1,000 mls @ 999 mls/hr IV STAT ONE


   Stop: 12/07/17 20:23


   Last Admin: 12/07/17 19:54 Dose:  999 mls/hr


Sodium Chloride (Normal Saline)  1,000 mls @ 999 mls/hr IV STAT ONE


   Stop: 12/07/17 21:13


   Last Admin: 12/07/17 20:34 Dose:  999 mls/hr


Sodium Chloride (Normal Saline)  1,000 mls @ 150 mls/hr IV ASDIRECTED [facility]


   Last Admin: 12/09/17 03:44 Dose:  150 mls/hr


Influenza Virus Vaccine (Fluarix Quad 8411-0815)  60 mcg IM .ONCE ONE


   Stop: 12/07/17 23:46


   Last Admin: 12/08/17 15:03 Dose:  60 mcg


Insulin Glargine (Lantus Solostar)  10 units SUBCUT BIDAC [facility]


   Last Admin: 12/08/17 06:58 Dose:  Not Given


Iopamidol (Isovue Multipack-370 (76%))  100 ml IVPUSH ONETIME STA


   Stop: 12/07/17 20:19


   Last Admin: 12/07/17 20:23 Dose:  100 ml


Morphine Sulfate (Morphine)  2 mg IVPUSH ONETIME ONE


   Stop: 12/07/17 19:24


   Last Admin: 12/07/17 19:54 Dose:  2 mg


Morphine Sulfate (Morphine)  2 mg IV ONETIME ONE


   Stop: 12/07/17 20:14


   Last Admin: 12/07/17 20:36 Dose:  2 mg


Morphine Sulfate (Morphine)  2 mg IVPUSH Q2H PRN


   PRN Reason: Pain (severe 7-10)


   Stop: 12/08/17 22:40


   Last Admin: 12/08/17 06:56 Dose:  2 mg


Pantoprazole Sodium (Protonix Iv***)  80 mg IVPUSH .BOLUS ONE


   Stop: 12/07/17 21:01


   Last Admin: 12/07/17 21:20 Dose:  80 mg











- Exam


General: Reports: Alert, Oriented


HEENT: Reports: Pupils Equal, EOMI


Lungs: Reports: Clear to Auscultation, Normal Respiratory Effort


Cardiovascular: Reports: Regular Rate, Regular Rhythm


GI/Abdominal Exam: Normal Bowel Sounds, Soft, Other (pain improved)


Back Exam: Reports: Normal Inspection


Skin: Reports: Warm, Dry, Intact


Neurological: Reports: No New Focal Deficit


Psy/Mental Status: Reports: Alert, Normal Affect, Normal Mood





*Q Meaningful Use (DIS)





- VTE *Q


VTE Criteria *Q: 








- Stroke *Q


Stroke Criteria *Q: 








- AMI *Q


AMI Criteria *Q:

## 2019-03-30 ENCOUNTER — HOSPITAL ENCOUNTER (EMERGENCY)
Dept: HOSPITAL 56 - MW.ED | Age: 54
Discharge: HOME | End: 2019-03-30
Payer: COMMERCIAL

## 2019-03-30 VITALS — DIASTOLIC BLOOD PRESSURE: 96 MMHG | SYSTOLIC BLOOD PRESSURE: 126 MMHG

## 2019-03-30 DIAGNOSIS — I10: ICD-10-CM

## 2019-03-30 DIAGNOSIS — F32.9: ICD-10-CM

## 2019-03-30 DIAGNOSIS — E11.9: ICD-10-CM

## 2019-03-30 DIAGNOSIS — Z23: ICD-10-CM

## 2019-03-30 DIAGNOSIS — Z79.899: ICD-10-CM

## 2019-03-30 DIAGNOSIS — F41.9: ICD-10-CM

## 2019-03-30 DIAGNOSIS — B96.89: ICD-10-CM

## 2019-03-30 DIAGNOSIS — L08.9: Primary | ICD-10-CM

## 2019-03-30 DIAGNOSIS — E78.00: ICD-10-CM

## 2019-03-30 DIAGNOSIS — F17.210: ICD-10-CM

## 2019-03-30 NOTE — EDM.PDOC
ED HPI GENERAL MEDICAL PROBLEM





- General


Chief Complaint: Lower Extremity Injury/Pain


Stated Complaint: LEFT LEG PAIN


Time Seen by Provider: 03/30/19 10:35


Source of Information: Reports: Patient


History Limitations: Reports: No Limitations





- History of Present Illness


INITIAL COMMENTS - FREE TEXT/NARRATIVE: 


HISTORY AND PHYSICAL:





History of present illness:


Patient is a 53-year-old male presents to the ED today with concern of a 

localized skin infection 1 day. 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 Patient 

states the puncture wound was superficial and did not bleed. Patient states he 

woke up the area that had been slightly punctured was erythematous and 

spreading redness. Patient is not up to date with his tetanus. 





Patient denies fever, chills, chest pain, shortness of breath, or cough. Denies 

headache, syncope, or near syncope. Denies nausea, vomiting, abdominal pain. 

Patient has been eating and drinking appropriately.





Review of systems: 


As per history of present illness and below otherwise all systems reviewed and 

negative.





Past medical history: 


As per history of present illness and as reviewed below otherwise 

noncontributory.





Surgical history: 


As per history of present illness and as reviewed below otherwise 

noncontributory.





Social history: 


See social history for further information





Family history: 


As per history of present illness and as reviewed below otherwise 

noncontributory.





Physical exam:


General: Patient is alert, oriented, in no acute distress. He is sitting 

comfortably on the exam table.


HEENT: Atraumatic, normocephalic, pupils equal and reactive bilaterally, 

negative for conjunctival pallor or scleral icterus, mucous membranes moist, 

TMs normal bilaterally, throat clear, neck supple, nontender, trachea midline. 

No drooling or trismus noted. No meningeal signs. No hot potato voice noted. 


Lungs: Clear to auscultation, breath sounds equal bilaterally, chest nontender.


Heart: S1S2, regular rate and rhythm without overt murmur


Abdomen: Soft, nondistended, nontender. Negative for masses or 

hepatosplenomegaly. Negative for costovertebral tenderness.


Pelvis: Stable nontender.


Genitourinary: Deferred.


Rectal: Deferred.


Skin: See extremities. Otherwise, intact, warm, dry. No lesions or rashes noted.


Extremities: There is a superficial punctate area surrounded by a 3 cm 

circumferential area of erythema of the right shin. No edema or tracking. 

Negative crepitus. Otherwise, negative for cords or calf pain. Neurovascular 

unremarkable. Dorsalis pedis and posterior tibial pulses grossly intact 

bilaterally.


Neuro: Awake, alert, oriented. Cranial nerves II through XII unremarkable. 

Cerebellum unremarkable. Motor and sensory unremarkable throughout. Exam 

nonfocal.





Notes:


On exam, patient does appear to have localized starting infection. She was 

antibiotics at this time. Did draw a circumferential area of surrounding 

erythema. Instructed patient to monitor for worsening signs of infection.  

Supportive care measures were reviewed and discussed. Voices understanding and 

is agreeable to plan of care. Denies any further questions or concerns at this 

time.





Diagnostics:


None





Therapeutics:


Tdap





Prescription:


Keflex





Impression: 


Localized skin infection, right leg





Plan:


1. Take medications as prescribed. He can alternate ibuprofen and Tylenol as 

directed for pain and discomfort.


2. Continue to monitor the area for spread of infection as discussed.


3. Follow up with her primary care provider as discussed.


4. Return to the ED as needed and as discussed.





Definitive disposition and diagnosis as appropriate pending reevaluation and 

review of above.





  ** left lower leg


Pain Score (Numeric/FACES): 3





- Related Data


 Allergies











Allergy/AdvReac Type Severity Reaction Status Date / Time


 


No Known Allergies Allergy   Verified 03/30/19 10:29











Home Meds: 


 Home Meds





Fenofibrate Nanocrystallized [Fenofibrate] 145 mg PO DAILY 05/15/15 [History]


Venlafaxine HCl [Venlafaxine ER] 75 tab PO DAILY 05/15/15 [History]


Zolpidem Tartrate 5 mg PO BEDTIME 05/15/15 [History]


Lisinopril 5 mg PO DAILY 12/07/17 [History]


atorvaSTATin [Lipitor] 40 mg PO BEDTIME 12/07/17 [History]


Calcium Carbonate [Tums] 1 tab.chew CHEW ASDIRECTED PRN 11/09/18 [History]


Ozempic 1 injection SUBCUT WEEKLY 11/09/18 [History]


Acetaminophen/HYDROcodone [Norco 325-10 MG] 1 - 2 tab PO Q4H PRN #60 tablet 11/ 14/18 [Rx]


Ketorolac [Toradol] 10 mg PO Q6H PRN #20 tablet 11/14/18 [Rx]











Past Medical History


HEENT History: Reports: None


Other HEENT History: wears glasses "sometimes"


Cardiovascular History: Reports: High Cholesterol, Hypertension


Other Cardiovascular History: states takes lisinopril to protect his kidneys 

due to diabetes


Respiratory History: Reports: None


Gastrointestinal History: Reports: Pancreatitis


Other Gastrointestinal History: occasional heartburn


Genitourinary History: Reports: None


Musculoskeletal History: Reports: Fracture


Neurological History: Reports: None


Psychiatric History: Reports: Anxiety, Depression


Endocrine/Metabolic History: Reports: Diabetes, Type II


Hematologic History: Reports: None


Immunologic History: Reports: None


Oncologic (Cancer) History: Reports: None


Dermatologic History: Reports: None





- Infectious Disease History


Infectious Disease History: Reports: Chicken Pox





- Past Surgical History


Head Surgeries/Procedures: Reports: None


HEENT Surgical History: Reports: Other (See Below)


Other HEENT Surgeries/Procedures: uses prescription eyeglasses


Cardiovascular Surgical History: Reports: None


Respiratory Surgical History: Reports: None


GI Surgical History: Reports: Hernia, Abdominal, Hernia, Inguinal, Other (See 

Below)


Other GI Surgeries/Procedures: Inguinal and abdominal hernia repair


Male  Surgical History: Reports: None


Endocrine Surgical History: Reports: None


Neurological Surgical History: Reports: None


Musculoskeletal Surgical History: Reports: Shoulder Surgery, Other (See Below)


Other Musculoskeletal Surgeries/Procedures:: Elbow, Shoulder and heel surgery.  

Cyst removal from shoulder and back.


Dermatological Surgical History: Reports: None





Social & Family History





- Family History


HEENT: Reports: None


Cardiac: Reports: None


Respiratory: Reports: None


GI: Reports: None


: Reports: None


OBGYN: Reports: None


Musculoskeletal: Reports: None


Neurological: Reports: None


Psychiatric: Reports: None


Endocrine/Metabolic: Reports: Diabetes, type II


Hematologic: Reports: None


Immunologic: Reports: None


Dermatologic: Reports: None


Oncologic: Reports: None





- Tobacco Use


Smoking Status *Q: Current Every Day Smoker


Years of Tobacco use: 30


Packs/Tins Daily: 1





- Caffeine Use


Caffeine Use: Reports: Energy Drinks, Soda, Tea





- Recreational Drug Use


Recreational Drug Use: No





Review of Systems





- Review of Systems


Review Of Systems: ROS reveals no pertinent complaints other than HPI.





ED EXAM, GENERAL





- Physical Exam


Exam: See Below (See dictation)





Course





- Vital Signs


Last Recorded V/S: 


 Last Vital Signs











Temp  36.4 C   03/30/19 10:27


 


Pulse  88   03/30/19 10:27


 


Resp  18   03/30/19 10:27


 


BP  133/92 H  03/30/19 10:27


 


Pulse Ox  98   03/30/19 10:27














- Orders/Labs/Meds


Orders: 


 Active Orders 24 hr











 Category Date Time Status


 


 Vaccines to be Administered [RC] PER UNIT ROUTINE Care  03/30/19 10:36 Active











Meds: 


Medications














Discontinued Medications














Generic Name Dose Route Start Last Admin





  Trade Name Rl  PRN Reason Stop Dose Admin


 


Diphtheria/Tetanus/Acell Pertussis  0.5 ml  03/30/19 10:35  





  Adacel  IM  03/30/19 10:36  





  .ONCE ONE   





     





     





     





     














Departure





- Departure


Time of Disposition: 10:45


Disposition: Home, Self-Care 01


Clinical Impression: 


 Localized bacterial skin infection








- Discharge Information


Referrals: 


PCP,Unknown [Primary Care Provider] - 


Forms:  ED Department Discharge


Additional Instructions: 


The following information is given to patients seen in the emergency department 

who are being discharged to home. This information is to outline your options 

for follow-up care. We provide all patients seen in our emergency department 

with a follow-up referral.





The need for follow-up, as well as the timing and circumstances, are variable 

depending upon the specifics of your emergency department visit.





If you don't have a primary care physician on staff, we will provide you with a 

referral. We always advise you to contact your personal physician following an 

emergency department visit to inform them of the circumstance of the visit and 

for follow-up with them and/or the need for any referrals to a consulting 

specialist.





The emergency department will also refer you to a specialist when appropriate. 

This referral assures that you have the opportunity for follow-up care with a 

specialist. All of these measure are taken in an effort to provide you with 

optimal care, which includes your follow-up.





Under all circumstances we always encourage you to contact your private 

physician who remains a resource for coordinating your care. When calling for 

follow-up care, please make the office aware that this follow-up is from your 

recent emergency room visit. If for any reason you are refused follow-up, 

please contact the [facility] Emergency 

Department at [phone] and asked to speak to the emergency department 

charge nurse.





[facility]


Primary Care


[address] [place] [id number]


Phone: [phone]


Fax: [phone]





[facility]


[address] [place] [address]


Phone: [phone]


Fax: [phone]





1. Take medications as prescribed. He can alternate ibuprofen and Tylenol as 

directed for pain and discomfort.


2. Continue to monitor the area for spread of infection as discussed.


3. Follow up with her primary care provider as discussed.


4. Return to the ED as needed and as discussed.

















 








- My Orders


Last 24 Hours: 


My Active Orders





03/30/19 10:36


Vaccines to be Administered [RC] PER UNIT ROUTINE 














- Assessment/Plan


Last 24 Hours: 


My Active Orders





03/30/19 10:36


Vaccines to be Administered [RC] PER UNIT ROUTINE

## 2024-08-21 ENCOUNTER — HOSPITAL ENCOUNTER (OUTPATIENT)
Dept: HOSPITAL 56 - MW.SDS | Age: 59
Discharge: HOME | End: 2024-08-21
Attending: ORTHOPAEDIC SURGERY
Payer: COMMERCIAL

## 2024-08-21 VITALS — HEART RATE: 85 BPM | DIASTOLIC BLOOD PRESSURE: 65 MMHG | SYSTOLIC BLOOD PRESSURE: 118 MMHG

## 2024-08-21 DIAGNOSIS — E66.9: ICD-10-CM

## 2024-08-21 DIAGNOSIS — S46.211A: ICD-10-CM

## 2024-08-21 DIAGNOSIS — I10: ICD-10-CM

## 2024-08-21 DIAGNOSIS — E11.9: ICD-10-CM

## 2024-08-21 DIAGNOSIS — M75.111: Primary | ICD-10-CM

## 2024-08-21 DIAGNOSIS — F17.210: ICD-10-CM

## 2024-08-21 DIAGNOSIS — Z79.899: ICD-10-CM

## 2024-08-21 DIAGNOSIS — F41.8: ICD-10-CM

## 2024-08-21 PROCEDURE — 64415 NJX AA&/STRD BRCH PLXS IMG: CPT

## 2024-08-21 PROCEDURE — 29826 SHO ARTHRS SRG DECOMPRESSION: CPT

## 2024-08-21 PROCEDURE — C1713 ANCHOR/SCREW BN/BN,TIS/BN: HCPCS

## 2024-08-21 PROCEDURE — 29827 SHO ARTHRS SRG RT8TR CUF RPR: CPT

## 2024-08-21 PROCEDURE — 82947 ASSAY GLUCOSE BLOOD QUANT: CPT

## 2024-08-21 PROCEDURE — 29822 SHO ARTHRS SRG LMTD DBRDMT: CPT

## 2024-08-21 PROCEDURE — 29828 SHO ARTHRS SRG BICP TENODSIS: CPT

## 2024-08-21 RX ADMIN — HYDROCODONE BITARTRATE AND ACETAMINOPHEN ONE TAB: 5; 325 TABLET ORAL at 12:39

## 2024-08-21 RX ADMIN — ALBUTEROL SULFATE PRN MG: 2.5 SOLUTION RESPIRATORY (INHALATION) at 11:55

## 2025-06-24 ENCOUNTER — HOSPITAL ENCOUNTER (EMERGENCY)
Dept: HOSPITAL 56 - MW.ED | Age: 60
Discharge: HOME | End: 2025-06-24
Payer: MEDICAID

## 2025-06-24 VITALS — DIASTOLIC BLOOD PRESSURE: 84 MMHG | HEART RATE: 109 BPM | SYSTOLIC BLOOD PRESSURE: 167 MMHG

## 2025-06-24 DIAGNOSIS — J18.9: ICD-10-CM

## 2025-06-24 DIAGNOSIS — F17.210: ICD-10-CM

## 2025-06-24 DIAGNOSIS — G44.209: Primary | ICD-10-CM

## 2025-06-24 DIAGNOSIS — R19.7: ICD-10-CM

## 2025-06-24 DIAGNOSIS — I10: ICD-10-CM

## 2025-06-24 DIAGNOSIS — E78.00: ICD-10-CM

## 2025-06-24 DIAGNOSIS — E11.9: ICD-10-CM

## 2025-06-24 DIAGNOSIS — Z79.899: ICD-10-CM

## 2025-06-24 LAB
ALBUMIN SERPL-MCNC: 2.9 G/DL (ref 3.4–5)
ALBUMIN/GLOB SERPL: 0.6 {RATIO} (ref 0.9–1.6)
ALP SERPL-CCNC: 226 U/L (ref 46–116)
ALT SERPL-CCNC: 48 IU/L (ref 14–63)
AMORPH SED URNS QL MICRO: (no result)
APPEARANCE UR: CLEAR
AST SERPL-CCNC: 132 IU/L (ref 15–37)
BACTERIA URNS QL MICRO: (no result)
BASOPHILS # BLD AUTO: 0.05 K/UL (ref 0–0.2)
BASOPHILS NFR BLD AUTO: 0.8 % (ref 0–1)
BILIRUB SERPL-MCNC: 4.1 MG/DL (ref 0.2–1)
BILIRUB UR QL CFM: (no result)
BILIRUB UR STRIP-MCNC: NEGATIVE MG/DL
BNP SERPL-MCNC: 59 PG/ML (ref 0–125)
BUN SERPL-MCNC: 7 MG/DL (ref 7–18)
CALCIUM SERPL-MCNC: 8.4 MG/DL (ref 8.5–10.1)
CAOX CRY #/AREA URNS HPF: (no result) /[HPF]
CHLORIDE SERPL-SCNC: 101 MMOL/L (ref 98–107)
CK SERPL-CCNC: 211 U/L (ref 26–308)
CO2 SERPL-SCNC: 26.9 MMOL/L (ref 21–32)
COARSE GRAN CASTS #/AREA URNS LPF: (no result) /[LPF]
COLOR UR: YELLOW
CREAT CL 24H UR+SERPL-VRATE: (no result) ML/MIN
CREAT SERPL-MCNC: 1 MG/DL (ref 0.8–1.3)
CRYSTALS #/AREA URNS HPF: (no result) /[HPF]
EGFRCR SERPLBLD CKD-EPI 2021: 86 ML/MIN (ref 60–?)
EOSINOPHIL # BLD AUTO: 0.1 K/UL (ref 0–0.45)
EOSINOPHIL NFR BLD AUTO: 1.7 % (ref 0–6)
EPI CELLS #/AREA URNS HPF: (no result) /[HPF]
FINE GRAN CASTS #/AREA URNS LPF: (no result) /[LPF]
GLOBULIN SER-MCNC: 5.2 G/DL (ref 2.6–4)
GLUCOSE SERPL-MCNC: 140 MG/DL (ref 74–106)
GLUCOSE UR STRIP-MCNC: NEGATIVE MG/DL
HCT VFR BLD AUTO: 39.6 % (ref 42–52)
HGB BLD-MCNC: 13.6 G/DL (ref 14–18)
HYALINE CASTS #/AREA URNS LPF: (no result) /[LPF]
IMM GRANULOCYTES # BLD: 0.02 K/UL (ref 0–0.05)
IMM GRANULOCYTES NFR BLD: 0.3 % (ref 0–0.4)
KETONES UR STRIP-MCNC: NEGATIVE MG/DL
LEUKOCYTE ESTERASE UR QL STRIP: NEGATIVE
LIPASE SERPL-CCNC: 144 U/L (ref 16–77)
LYMPHOCYTES # BLD AUTO: 0.81 K/UL (ref 1–4.8)
LYMPHOCYTES NFR BLD AUTO: 13.8 % (ref 24–44)
MCH RBC QN AUTO: 34 PG (ref 28–32)
MCHC RBC AUTO-ENTMCNC: 34.3 G/DL (ref 32–36)
MCHC RBC AUTO-ENTMCNC: 99 FL (ref 83–99)
MONOCYTES # BLD AUTO: 0.85 K/UL (ref 0–0.8)
MONOCYTES NFR BLD AUTO: 14.4 % (ref 0–8)
MUCOUS THREADS URNS QL MICRO: (no result)
NEUTROPHILS # BLD AUTO: 4.06 K/UL (ref 1.8–7.7)
NEUTROPHILS NFR BLD AUTO: 69 % (ref 41–71)
NITRITE UR QL: NEGATIVE
NRBC BLD AUTO-RTO: 0 /100WBC (ref 0–0.2)
NRBC BLD AUTO-RTO: 0 K/UL (ref 0–0.02)
OTHER ELEMENTS URNS MICRO: (no result)
OVAL FAT BODIES #/AREA URNS HPF: (no result) /[HPF]
PH UR STRIP: 7 [PH] (ref 5–8)
PLATELET # BLD AUTO: 79 K/UL (ref 150–400)
PMV BLD AUTO: 10.6 FL (ref 9.4–12.4)
POTASSIUM SERPL-SCNC: 3.9 MMOL/L (ref 3.5–5.1)
PROT SERPL-MCNC: 8.1 G/DL (ref 6.4–8.2)
PROT UR STRIP-MCNC: NEGATIVE MG/DL
RBC # BLD AUTO: 4 M/UL (ref 4.52–5.9)
RBC # URNS HPF: (no result) /ML
RBC CASTS #/AREA URNS HPF: (no result) /[HPF]
RBC UR QL: NEGATIVE
RENAL EPI CELLS #/AREA URNS HPF: (no result) /[HPF]
SODIUM SERPL-SCNC: 136 MMOL/L (ref 136–148)
SP GR UR STRIP: 1.01 (ref 1–1.03)
SPERM #/AREA URNS HPF: (no result) /[HPF]
SQUAMOUS #/AREA URNS HPF: (no result) /[HPF]
TRI-PHOS CRY #/AREA URNS HPF: (no result) /[HPF]
TRICHOMONAS UR QL MICRO: (no result)
URATE CRY #/AREA URNS HPF: (no result) /[HPF]
UROBILINOGEN UR STRIP-ACNC: 4 EU/DL (ref ?–2)
WAXY CASTS #/AREA URNS HPF: (no result) /[HPF]
WBC # BLD AUTO: 5.89 K/UL (ref 3.9–11.3)
WBC CASTS #/AREA URNS HPF: (no result) /[HPF]
WBC UR QL: (no result)
YEAST URNS QL MICRO: (no result)

## 2025-06-24 PROCEDURE — 96374 THER/PROPH/DIAG INJ IV PUSH: CPT

## 2025-06-24 PROCEDURE — 71045 X-RAY EXAM CHEST 1 VIEW: CPT

## 2025-06-24 PROCEDURE — 84484 ASSAY OF TROPONIN QUANT: CPT

## 2025-06-24 PROCEDURE — 93005 ELECTROCARDIOGRAM TRACING: CPT

## 2025-06-24 PROCEDURE — 99284 EMERGENCY DEPT VISIT MOD MDM: CPT

## 2025-06-24 PROCEDURE — 85025 COMPLETE CBC W/AUTO DIFF WBC: CPT

## 2025-06-24 PROCEDURE — 36415 COLL VENOUS BLD VENIPUNCTURE: CPT

## 2025-06-24 PROCEDURE — 70450 CT HEAD/BRAIN W/O DYE: CPT

## 2025-06-24 PROCEDURE — 82550 ASSAY OF CK (CPK): CPT

## 2025-06-24 PROCEDURE — 96375 TX/PRO/DX INJ NEW DRUG ADDON: CPT

## 2025-06-24 PROCEDURE — 83880 ASSAY OF NATRIURETIC PEPTIDE: CPT

## 2025-06-24 PROCEDURE — 81001 URINALYSIS AUTO W/SCOPE: CPT

## 2025-06-24 PROCEDURE — 80053 COMPREHEN METABOLIC PANEL: CPT

## 2025-06-24 PROCEDURE — 83690 ASSAY OF LIPASE: CPT

## 2025-06-24 RX ADMIN — SODIUM CHLORIDE ONE MLS/HR: 9 INJECTION, SOLUTION INTRAVENOUS at 21:03

## 2025-06-24 RX ADMIN — KETOROLAC TROMETHAMINE ONE MG: 30 INJECTION, SOLUTION INTRAMUSCULAR at 21:03

## 2025-06-24 RX ADMIN — ACETAMINOPHEN ONE MG: 500 TABLET, FILM COATED ORAL at 21:10

## 2025-06-24 RX ADMIN — PROCHLORPERAZINE EDISYLATE ONE MG: 5 INJECTION INTRAMUSCULAR; INTRAVENOUS at 20:07
